# Patient Record
Sex: MALE | Race: WHITE | HISPANIC OR LATINO | Employment: FULL TIME | URBAN - METROPOLITAN AREA
[De-identification: names, ages, dates, MRNs, and addresses within clinical notes are randomized per-mention and may not be internally consistent; named-entity substitution may affect disease eponyms.]

---

## 2021-09-03 NOTE — TELEPHONE ENCOUNTER
Rx Refill Note  Requested Prescriptions     Pending Prescriptions Disp Refills   • rosuvastatin (CRESTOR) 10 MG tablet [Pharmacy Med Name: ROSUVASTATIN 10MG TABLETS] 90 tablet      Sig: TAKE 1 TABLET BY MOUTH DAILY   • levothyroxine (SYNTHROID, LEVOTHROID) 125 MCG tablet [Pharmacy Med Name: LEVOTHYROXINE 0.125MG (125MCG) TAB] 90 tablet      Sig: TAKE 1 TABLET BY MOUTH DAILY IN THE MORNING ON AN EMPTY STOMACH      Last office visit with prescribing clinician: Visit date not found      Next office visit with prescribing clinician: 9/20/2021            Renetta Dos Santos MA  09/03/21, 09:14 EDT

## 2021-09-06 RX ORDER — LEVOTHYROXINE SODIUM 0.12 MG/1
TABLET ORAL
Qty: 90 TABLET | Refills: 2 | Status: SHIPPED | OUTPATIENT
Start: 2021-09-06 | End: 2022-02-21 | Stop reason: SDUPTHER

## 2021-09-06 RX ORDER — ROSUVASTATIN CALCIUM 10 MG/1
TABLET, COATED ORAL
Qty: 90 TABLET | Refills: 2 | Status: SHIPPED | OUTPATIENT
Start: 2021-09-06 | End: 2022-02-21 | Stop reason: SDUPTHER

## 2021-09-20 ENCOUNTER — OFFICE VISIT (OUTPATIENT)
Dept: INTERNAL MEDICINE | Facility: CLINIC | Age: 61
End: 2021-09-20

## 2021-09-20 VITALS
SYSTOLIC BLOOD PRESSURE: 124 MMHG | TEMPERATURE: 97 F | BODY MASS INDEX: 28.49 KG/M2 | HEART RATE: 68 BPM | WEIGHT: 188 LBS | HEIGHT: 68 IN | OXYGEN SATURATION: 99 % | DIASTOLIC BLOOD PRESSURE: 72 MMHG | RESPIRATION RATE: 18 BRPM

## 2021-09-20 DIAGNOSIS — E03.9 HYPOTHYROIDISM, UNSPECIFIED TYPE: ICD-10-CM

## 2021-09-20 DIAGNOSIS — Z00.00 WELL ADULT EXAM: Primary | ICD-10-CM

## 2021-09-20 DIAGNOSIS — Z11.4 ENCOUNTER FOR SCREENING FOR HIV: ICD-10-CM

## 2021-09-20 DIAGNOSIS — Z11.59 ENCOUNTER FOR HCV SCREENING TEST FOR LOW RISK PATIENT: ICD-10-CM

## 2021-09-20 DIAGNOSIS — J30.2 SEASONAL ALLERGIES: ICD-10-CM

## 2021-09-20 DIAGNOSIS — Z12.5 PROSTATE CANCER SCREENING: ICD-10-CM

## 2021-09-20 PROCEDURE — 90686 IIV4 VACC NO PRSV 0.5 ML IM: CPT | Performed by: INTERNAL MEDICINE

## 2021-09-20 PROCEDURE — 99396 PREV VISIT EST AGE 40-64: CPT | Performed by: INTERNAL MEDICINE

## 2021-09-20 PROCEDURE — 90471 IMMUNIZATION ADMIN: CPT | Performed by: INTERNAL MEDICINE

## 2021-09-20 NOTE — PROGRESS NOTES
"Chief Complaint   Patient presents with   • Hypothyroidism     follow up    • Med Refill       Subjective   Crow Wolfe is a 61 y.o. male.     Presenting today for physical exam.     He is overall feeling well physically, except for seasonal allergies. Over the past two days he has had worsening congestion, sinus pressure, itchy, watery eyes, and post nasal drip. This began after a long bike ride in the [facility]. He has been using an otc antihistamine with minimal relief.     Emotionally feeling well, phq 2 negative.     Hypothyroidism  Associated symptoms include congestion. Pertinent negatives include no arthralgias, chest pain, chills, coughing, fever, myalgias, rash, sore throat or vomiting.        The following portions of the patient's history were reviewed and updated as appropriate: allergies, current medications, past family history, past medical history, past social history, past surgical history, and problem list.    Review of Systems   Constitutional: Negative for chills and fever.   HENT: Positive for congestion, postnasal drip and rhinorrhea. Negative for sore throat.    Eyes: Positive for itching. Negative for redness.   Respiratory: Negative for cough and shortness of breath.    Cardiovascular: Negative for chest pain and palpitations.   Gastrointestinal: Negative for constipation, diarrhea and vomiting.   Genitourinary: Negative for dysuria and hematuria.   Musculoskeletal: Negative for arthralgias and myalgias.   Skin: Negative for rash and bruise.   Neurological: Negative for seizures and syncope.         Objective   Body mass index is 28.59 kg/m².   Vitals:    09/20/21 1021   BP: 124/72   Pulse: 68   Resp: 18   Temp: 97 °F (36.1 °C)   SpO2: 99%   Weight: 85.3 kg (188 lb)   Height: 172.7 cm (68\")        Physical Exam  Constitutional:       Appearance: Normal appearance.   HENT:      Head: Normocephalic and atraumatic.      Right Ear: Ear canal normal. There is impacted cerumen.      Left Ear: " Tympanic membrane normal.      Nose: Congestion present.      Mouth/Throat:      Mouth: Mucous membranes are moist.      Pharynx: Oropharynx is clear. Posterior oropharyngeal erythema present.   Eyes:      Extraocular Movements: Extraocular movements intact.      Conjunctiva/sclera: Conjunctivae normal.      Pupils: Pupils are equal, round, and reactive to light.   Neck:      Vascular: No carotid bruit.   Cardiovascular:      Rate and Rhythm: Normal rate and regular rhythm.      Heart sounds: No murmur heard.     Pulmonary:      Effort: Pulmonary effort is normal.      Breath sounds: Normal breath sounds.   Abdominal:      General: Bowel sounds are normal.      Palpations: Abdomen is soft.   Musculoskeletal:      Cervical back: Neck supple.   Lymphadenopathy:      Cervical: No cervical adenopathy.   Skin:     General: Skin is warm and dry.      Capillary Refill: Capillary refill takes less than 2 seconds.   Neurological:      Mental Status: He is alert.           Current Outpatient Medications:   •  levothyroxine (SYNTHROID, LEVOTHROID) 125 MCG tablet, TAKE 1 TABLET BY MOUTH DAILY IN THE MORNING ON AN EMPTY STOMACH, Disp: 90 tablet, Rfl: 2  •  rosuvastatin (CRESTOR) 10 MG tablet, TAKE 1 TABLET BY MOUTH DAILY, Disp: 90 tablet, Rfl: 2     No results found for: CBCDIF, CMP, LIPIDINTERP, HGBA1C, TSH, [id number], PSA, TESTOSTERONE     Health Maintenance   Topic Date Due   • TDAP/TD VACCINES (1 - Tdap) Never done   • ZOSTER VACCINE (2 of 2) 04/08/2019   • LIPID PANEL  Never done   • INFLUENZA VACCINE  10/01/2021        There is no immunization history for the selected administration types on file for this patient.    Assessment/Plan   Diagnoses and all orders for this visit:    1. Well adult exam (Primary)  -     CBC & Differential  -     Comprehensive Metabolic Panel  -     Lipid Panel With / Chol / HDL Ratio  -     Hemoglobin A1c  -     Ambulatory Referral For Screening Colonoscopy  -     FluLaval >6 Months  ([id number])    2. Encounter for screening for HIV  -     HIV-1 / O / 2 Ag / Antibody 4th Generation    3. Encounter for HCV screening test for low risk patient  -     Hepatitis C Antibody    4. Hypothyroidism, unspecified type  Assessment & Plan:  Checking TSH today; will adjust dose of synthroid base on labs.     Orders:  -     TSH    5. Prostate cancer screening  -     PSA Screen    6. Seasonal allergies  Assessment & Plan:  Recommended addition of flonase nasal spray and pataday eye drops as needed for treatment of seasonal allergies, especially moving into ragweed season. RTC if no improvement in 2-4 weeks or sooner if additional symptoms develop.            Well adult exam  - labs checked and evaluated    Colonoscopy - needs this year   Prostate - checking   Glaucoma - yearly   AAA - n/a  Lung cancer - n/a  HIV - checking  HCV - checking  DM - chekcing  HLD - checking  Smoking - no  Depression - PHQ2 negative   Vaccines - influenza needed; giving today   Falls - no   Alcohol Screening - no     Discussed mental health, sexual health, substance use, abuse, anticipatory guidance given.      Return in about 6 months (around 3/20/2022).     Kady Carrillo MD  Internal Medicine/Pediatrics PGY-4

## 2021-09-20 NOTE — ASSESSMENT & PLAN NOTE
Recommended addition of flonase nasal spray and pataday eye drops as needed for treatment of seasonal allergies, especially moving into ragweed season. RTC if no improvement in 2-4 weeks or sooner if additional symptoms develop.

## 2021-09-21 LAB
ALBUMIN SERPL-MCNC: 4.8 G/DL (ref 3.5–5.2)
ALBUMIN/GLOB SERPL: 2.3 G/DL
ALP SERPL-CCNC: 65 U/L (ref 39–117)
ALT SERPL-CCNC: 34 U/L (ref 1–41)
AST SERPL-CCNC: 32 U/L (ref 1–40)
BASOPHILS # BLD AUTO: 0.03 10*3/MM3 (ref 0–0.2)
BASOPHILS NFR BLD AUTO: 0.7 % (ref 0–1.5)
BILIRUB SERPL-MCNC: 0.5 MG/DL (ref 0–1.2)
BUN SERPL-MCNC: 23 MG/DL (ref 8–23)
BUN/CREAT SERPL: 25.6 (ref 7–25)
CALCIUM SERPL-MCNC: 9.7 MG/DL (ref 8.6–10.5)
CHLORIDE SERPL-SCNC: 101 MMOL/L (ref 98–107)
CHOLEST SERPL-MCNC: 185 MG/DL (ref 0–200)
CHOLEST/HDLC SERPL: 2.47 {RATIO}
CO2 SERPL-SCNC: 27.4 MMOL/L (ref 22–29)
CREAT SERPL-MCNC: 0.9 MG/DL (ref 0.76–1.27)
EOSINOPHIL # BLD AUTO: 0.04 10*3/MM3 (ref 0–0.4)
EOSINOPHIL NFR BLD AUTO: 1 % (ref 0.3–6.2)
ERYTHROCYTE [DISTWIDTH] IN BLOOD BY AUTOMATED COUNT: 14.4 % (ref 12.3–15.4)
GLOBULIN SER CALC-MCNC: 2.1 GM/DL
GLUCOSE SERPL-MCNC: 83 MG/DL (ref 65–99)
HBA1C MFR BLD: 5.9 % (ref 4.8–5.6)
HCT VFR BLD AUTO: 42.6 % (ref 37.5–51)
HCV AB S/CO SERPL IA: <0.1 S/CO RATIO (ref 0–0.9)
HDLC SERPL-MCNC: 75 MG/DL (ref 40–60)
HGB BLD-MCNC: 14.2 G/DL (ref 13–17.7)
HIV 1+2 AB+HIV1 P24 AG SERPL QL IA: NON REACTIVE
IMM GRANULOCYTES # BLD AUTO: 0.01 10*3/MM3 (ref 0–0.05)
IMM GRANULOCYTES NFR BLD AUTO: 0.2 % (ref 0–0.5)
LDLC SERPL CALC-MCNC: 96 MG/DL (ref 0–100)
LYMPHOCYTES # BLD AUTO: 1.04 10*3/MM3 (ref 0.7–3.1)
LYMPHOCYTES NFR BLD AUTO: 25.7 % (ref 19.6–45.3)
MCH RBC QN AUTO: 29 PG (ref 26.6–33)
MCHC RBC AUTO-ENTMCNC: 33.3 G/DL (ref 31.5–35.7)
MCV RBC AUTO: 87.1 FL (ref 79–97)
MONOCYTES # BLD AUTO: 0.43 10*3/MM3 (ref 0.1–0.9)
MONOCYTES NFR BLD AUTO: 10.6 % (ref 5–12)
NEUTROPHILS # BLD AUTO: 2.49 10*3/MM3 (ref 1.7–7)
NEUTROPHILS NFR BLD AUTO: 61.8 % (ref 42.7–76)
NRBC BLD AUTO-RTO: 0 /100 WBC (ref 0–0.2)
PLATELET # BLD AUTO: 220 10*3/MM3 (ref 140–450)
POTASSIUM SERPL-SCNC: 4.7 MMOL/L (ref 3.5–5.2)
PROT SERPL-MCNC: 6.9 G/DL (ref 6–8.5)
PSA SERPL-MCNC: 2.39 NG/ML (ref 0–4)
RBC # BLD AUTO: 4.89 10*6/MM3 (ref 4.14–5.8)
SODIUM SERPL-SCNC: 138 MMOL/L (ref 136–145)
TRIGL SERPL-MCNC: 77 MG/DL (ref 0–150)
TSH SERPL DL<=0.005 MIU/L-ACNC: 0.33 UIU/ML (ref 0.27–4.2)
VLDLC SERPL CALC-MCNC: 14 MG/DL (ref 5–40)
WBC # BLD AUTO: 4.04 10*3/MM3 (ref 3.4–10.8)

## 2021-09-22 ENCOUNTER — PREP FOR SURGERY (OUTPATIENT)
Dept: SURGERY | Facility: SURGERY CENTER | Age: 61
End: 2021-09-22

## 2021-09-22 DIAGNOSIS — Z12.11 ENCOUNTER FOR SCREENING FOR MALIGNANT NEOPLASM OF COLON: Primary | ICD-10-CM

## 2021-09-23 RX ORDER — SODIUM CHLORIDE, SODIUM LACTATE, POTASSIUM CHLORIDE, CALCIUM CHLORIDE 600; 310; 30; 20 MG/100ML; MG/100ML; MG/100ML; MG/100ML
30 INJECTION, SOLUTION INTRAVENOUS CONTINUOUS PRN
Status: CANCELLED | OUTPATIENT
Start: 2021-09-23

## 2021-09-23 RX ORDER — SODIUM CHLORIDE 0.9 % (FLUSH) 0.9 %
3 SYRINGE (ML) INJECTION EVERY 12 HOURS SCHEDULED
Status: CANCELLED | OUTPATIENT
Start: 2021-09-23

## 2021-09-23 RX ORDER — SODIUM CHLORIDE 0.9 % (FLUSH) 0.9 %
10 SYRINGE (ML) INJECTION AS NEEDED
Status: CANCELLED | OUTPATIENT
Start: 2021-09-23

## 2022-01-05 NOTE — SIGNIFICANT NOTE
Education provided the Patient on the following:    - Nothing to Eat or Drink after MN the night before the procedure    - Avoid red/purple fluids while completing their bowel prep as ordered by physician  -Contact Gastrointerologist office for any questions about specific details regarding colon prep    -You will need to have someone drive you home after your colonoscopy and remain with you for 24 hours after the procedure  - The date of your Surgery, you may have one visitor at bedside or within 10-15 minutes of [facility]  -Please wear warm socks when you arrive for your colonoscopy  -Remove all jewelry and leave any valuables before arriving the day of your procedure (all will have to be removed before leaving preop)  -You will need to arrive at 0815 on 1/10/22 for your colonoscopy    -Feel free to contact us at: [phone] with any additional questions/concerns

## 2022-01-10 ENCOUNTER — HOSPITAL ENCOUNTER (OUTPATIENT)
Facility: SURGERY CENTER | Age: 62
Setting detail: HOSPITAL OUTPATIENT SURGERY
Discharge: HOME | End: 2022-01-10
Attending: INTERNAL MEDICINE | Admitting: INTERNAL MEDICINE

## 2022-01-10 ENCOUNTER — ANESTHESIA (OUTPATIENT)
Dept: SURGERY | Facility: SURGERY CENTER | Age: 62
End: 2022-01-10

## 2022-01-10 ENCOUNTER — ANESTHESIA EVENT (OUTPATIENT)
Dept: SURGERY | Facility: SURGERY CENTER | Age: 62
End: 2022-01-10

## 2022-01-10 VITALS
TEMPERATURE: 98 F | WEIGHT: 181 LBS | BODY MASS INDEX: 27.43 KG/M2 | HEIGHT: 68 IN | HEART RATE: 75 BPM | RESPIRATION RATE: 16 BRPM | SYSTOLIC BLOOD PRESSURE: 116 MMHG | OXYGEN SATURATION: 96 % | DIASTOLIC BLOOD PRESSURE: 78 MMHG

## 2022-01-10 DIAGNOSIS — Z12.11 ENCOUNTER FOR SCREENING FOR MALIGNANT NEOPLASM OF COLON: ICD-10-CM

## 2022-01-10 PROCEDURE — 88305 TISSUE EXAM BY PATHOLOGIST: CPT | Performed by: INTERNAL MEDICINE

## 2022-01-10 PROCEDURE — 45380 COLONOSCOPY AND BIOPSY: CPT | Performed by: INTERNAL MEDICINE

## 2022-01-10 PROCEDURE — 0DBK8ZZ EXCISION OF ASCENDING COLON, VIA NATURAL OR ARTIFICIAL OPENING ENDOSCOPIC: ICD-10-PCS | Performed by: INTERNAL MEDICINE

## 2022-01-10 PROCEDURE — 25010000002 PROPOFOL 10 MG/ML EMULSION: Performed by: NURSE ANESTHETIST, CERTIFIED REGISTERED

## 2022-01-10 RX ORDER — SODIUM CHLORIDE, SODIUM LACTATE, POTASSIUM CHLORIDE, CALCIUM CHLORIDE 600; 310; 30; 20 MG/100ML; MG/100ML; MG/100ML; MG/100ML
30 INJECTION, SOLUTION INTRAVENOUS CONTINUOUS PRN
Status: DISCONTINUED | OUTPATIENT
Start: 2022-01-10 | End: 2022-01-10 | Stop reason: HOSPADM

## 2022-01-10 RX ORDER — SODIUM CHLORIDE 0.9 % (FLUSH) 0.9 %
10 SYRINGE (ML) INJECTION AS NEEDED
Status: DISCONTINUED | OUTPATIENT
Start: 2022-01-10 | End: 2022-01-10 | Stop reason: HOSPADM

## 2022-01-10 RX ORDER — LIDOCAINE HYDROCHLORIDE 20 MG/ML
INJECTION, SOLUTION INFILTRATION; PERINEURAL AS NEEDED
Status: DISCONTINUED | OUTPATIENT
Start: 2022-01-10 | End: 2022-01-10 | Stop reason: SURG

## 2022-01-10 RX ORDER — PROPOFOL 10 MG/ML
VIAL (ML) INTRAVENOUS AS NEEDED
Status: DISCONTINUED | OUTPATIENT
Start: 2022-01-10 | End: 2022-01-10 | Stop reason: SURG

## 2022-01-10 RX ORDER — SODIUM CHLORIDE 0.9 % (FLUSH) 0.9 %
3 SYRINGE (ML) INJECTION EVERY 12 HOURS SCHEDULED
Status: DISCONTINUED | OUTPATIENT
Start: 2022-01-10 | End: 2022-01-10 | Stop reason: HOSPADM

## 2022-01-10 RX ADMIN — PROPOFOL 200 MCG/KG/MIN: 10 INJECTION, EMULSION INTRAVENOUS at 09:10

## 2022-01-10 RX ADMIN — SODIUM CHLORIDE, POTASSIUM CHLORIDE, SODIUM LACTATE AND CALCIUM CHLORIDE 30 ML/HR: 600; 310; 30; 20 INJECTION, SOLUTION INTRAVENOUS at 08:44

## 2022-01-10 RX ADMIN — PROPOFOL 100 MG: 10 INJECTION, EMULSION INTRAVENOUS at 09:10

## 2022-01-10 RX ADMIN — LIDOCAINE HYDROCHLORIDE 60 MG: 20 INJECTION, SOLUTION INFILTRATION; PERINEURAL at 09:10

## 2022-01-10 NOTE — H&P
No chief complaint on file.      HPI  screening         Problem List:    Patient Active Problem List   Diagnosis   • Well adult exam   • Encounter for screening for HIV   • Encounter for HCV screening test for low risk patient   • Hypothyroidism   • Prostate cancer screening   • Seasonal allergies       Medical History:    Past Medical History:   Diagnosis Date   • Cancer (HCC)     thyroid   • Hyperlipidemia    • Hypothyroidism         Social History:    Social History     Socioeconomic History   • Marital status:    Tobacco Use   • Smoking status: Never Smoker   • Smokeless tobacco: Never Used   Vaping Use   • Vaping Use: Never used   Substance and Sexual Activity   • Alcohol use: Yes     Comment: occasionally   • Sexual activity: Defer       Family History:   Family History   Problem Relation Age of Onset   • Cancer Sister        Surgical History:   Past Surgical History:   Procedure Laterality Date   • COLONOSCOPY     • TOTAL THYROIDECTOMY         No current facility-administered medications for this encounter.    Current Outpatient Medications:   •  levothyroxine (SYNTHROID, LEVOTHROID) 125 MCG tablet, TAKE 1 TABLET BY MOUTH DAILY IN THE MORNING ON AN EMPTY STOMACH, Disp: 90 tablet, Rfl: 2  •  rosuvastatin (CRESTOR) 10 MG tablet, TAKE 1 TABLET BY MOUTH DAILY, Disp: 90 tablet, Rfl: 2    Allergies: No Known Allergies     The following portions of the patient's history were reviewed by me and updated as appropriate: review of systems, allergies, current medications, past family history, past medical history, past social history, past surgical history and problem list.    There were no vitals filed for this visit.    PHYSICAL EXAM:    CONSTITUTIONAL:  today's vital signs reviewed by me  GASTROINTESTINAL: abdomen is soft nontender nondistended with normal active bowel sounds, no masses are appreciated    Assessment/ Plan  Encounter for screening colonoscopy    Risks and benefits as well as alternatives to  endoscopic evaluation were explained to the patient and they voiced understanding and wish to proceed.  These risks include but are not limited to the risk of bleeding, perforation, adverse reaction to sedation, and missed lesions.  The patient was given the opportunity to ask questions prior to the endoscopic procedure.

## 2022-01-10 NOTE — ANESTHESIA POSTPROCEDURE EVALUATION
"Patient: Crow Wolfe    Procedure Summary     Date: 01/10/22 Room / Location: [facility]  / [facility]    Anesthesia Start: 0907 Anesthesia Stop: 0926    Procedure: COLONOSCOPY WITH POLYPECTOMY (N/A ) Diagnosis:       Encounter for screening for malignant neoplasm of colon      (Encounter for screening for malignant neoplasm of colon [Z12.11])    Surgeons: Bruno Hernandez MD Provider: Kameron Gillespie MD    Anesthesia Type: MAC ASA Status: 2          Anesthesia Type: MAC    Vitals  Vitals Value Taken Time   /78 01/10/22 0945   Temp 36.7 °C (98 °F) 01/10/22 0925   Pulse 75 01/10/22 0945   Resp 16 01/10/22 0945   SpO2 96 % 01/10/22 0945           Post Anesthesia Care and Evaluation    Patient location during evaluation: bedside  Patient participation: complete - patient participated  Level of consciousness: awake and alert  Pain score: 0  Pain management: adequate  Airway patency: patent  Anesthetic complications: No anesthetic complications  PONV Status: none  Cardiovascular status: acceptable and hemodynamically stable  Respiratory status: acceptable and spontaneous ventilation  Hydration status: acceptable    Comments: /78   Pulse 75   Temp 36.7 °C (98 °F) (Temporal)   Resp 16   Ht 172.7 cm (68\")   Wt 82.1 kg (181 lb)   SpO2 96%   BMI 27.52 kg/m²         "

## 2022-01-10 NOTE — ANESTHESIA PREPROCEDURE EVALUATION
Anesthesia Evaluation     Patient summary reviewed and Nursing notes reviewed   NPO Solid Status: > 8 hours  NPO Liquid Status: > 2 hours           Airway   Mallampati: II  TM distance: >3 FB  Neck ROM: full  No difficulty expected  Dental - normal exam     Pulmonary - negative pulmonary ROS and normal exam   Cardiovascular - normal exam  Exercise tolerance: good (4-7 METS)    (+) hyperlipidemia,       Neuro/Psych  GI/Hepatic/Renal/Endo    (+)   thyroid problem hypothyroidism    Musculoskeletal (-) negative ROS    Abdominal    Substance History - negative use     OB/GYN          Other      history of cancer remission                    Anesthesia Plan    ASA 2     MAC     intravenous induction     Anesthetic plan, all risks, benefits, and alternatives have been provided, discussed and informed consent has been obtained with: patient and spouse/significant other.    Plan discussed with CRNA.

## 2022-01-11 LAB
LAB AP CASE REPORT: NORMAL
LAB AP CLINICAL INFORMATION: NORMAL
PATH REPORT.FINAL DX SPEC: NORMAL
PATH REPORT.GROSS SPEC: NORMAL

## 2022-02-21 NOTE — TELEPHONE ENCOUNTER
Caller: Crow Wolfe    Relationship: Self    Best call back number: [phone]    Requested Prescriptions:   Requested Prescriptions     Pending Prescriptions Disp Refills   • levothyroxine (SYNTHROID, LEVOTHROID) 125 MCG tablet 90 tablet 2     Sig: Take 1 tablet by mouth Every Morning.   • rosuvastatin (CRESTOR) 10 MG tablet 90 tablet 2     Sig: Take 1 tablet by mouth Daily.        Pharmacy where request should be sent: [facility] #[id number] - [facility] [place] - [address] [address] AT [facility] [address] [address] - [phone]  - [phone]      Additional details provided by patient: PATIENT STATES HE WILL BE LEAVING THE COUNTRY FOR 3 MONTHS STARTING ON 2/24/2022, WOULD LIKE TO REQUEST A 90 DAY SUPPLY BE CALLED IN.    Does the patient have less than a 3 day supply:  [] Yes  [x] No    Mayra Moore Rep   02/21/22 15:01 EST

## 2022-02-21 NOTE — TELEPHONE ENCOUNTER
Rx Refill Note  Requested Prescriptions     Pending Prescriptions Disp Refills   • levothyroxine (SYNTHROID, LEVOTHROID) 125 MCG tablet 90 tablet 2     Sig: Take 1 tablet by mouth Every Morning.   • rosuvastatin (CRESTOR) 10 MG tablet 90 tablet 2     Sig: Take 1 tablet by mouth Daily.      Last office visit with prescribing clinician: 9/20/2021      Next office visit with prescribing clinician: 3/21/2022            Renetta Dos Santos MA  02/21/22, 15:17 EST

## 2022-02-22 RX ORDER — LEVOTHYROXINE SODIUM 0.12 MG/1
125 TABLET ORAL EVERY MORNING
Qty: 90 TABLET | Refills: 2 | Status: SHIPPED | OUTPATIENT
Start: 2022-02-22 | End: 2022-10-21 | Stop reason: SDUPTHER

## 2022-02-22 RX ORDER — ROSUVASTATIN CALCIUM 10 MG/1
10 TABLET, COATED ORAL DAILY
Qty: 90 TABLET | Refills: 2 | Status: SHIPPED | OUTPATIENT
Start: 2022-02-22 | End: 2022-10-21 | Stop reason: SDUPTHER

## 2022-10-04 ENCOUNTER — OFFICE VISIT (OUTPATIENT)
Dept: INTERNAL MEDICINE | Facility: CLINIC | Age: 62
End: 2022-10-04

## 2022-10-04 VITALS
HEART RATE: 87 BPM | HEIGHT: 68 IN | RESPIRATION RATE: 18 BRPM | WEIGHT: 184 LBS | SYSTOLIC BLOOD PRESSURE: 120 MMHG | DIASTOLIC BLOOD PRESSURE: 76 MMHG | TEMPERATURE: 98.4 F | BODY MASS INDEX: 27.89 KG/M2 | OXYGEN SATURATION: 97 %

## 2022-10-04 DIAGNOSIS — Z08 ENCOUNTER FOR FOLLOW-UP SURVEILLANCE OF THYROID CANCER: ICD-10-CM

## 2022-10-04 DIAGNOSIS — Z00.00 WELL ADULT EXAM: ICD-10-CM

## 2022-10-04 DIAGNOSIS — C73 THYROID CANCER: ICD-10-CM

## 2022-10-04 DIAGNOSIS — Z12.5 PROSTATE CANCER SCREENING: ICD-10-CM

## 2022-10-04 DIAGNOSIS — E03.9 HYPOTHYROIDISM, UNSPECIFIED TYPE: Primary | ICD-10-CM

## 2022-10-04 DIAGNOSIS — Z85.850 ENCOUNTER FOR FOLLOW-UP SURVEILLANCE OF THYROID CANCER: ICD-10-CM

## 2022-10-04 PROCEDURE — 99396 PREV VISIT EST AGE 40-64: CPT | Performed by: INTERNAL MEDICINE

## 2022-10-04 PROCEDURE — 96127 BRIEF EMOTIONAL/BEHAV ASSMT: CPT | Performed by: INTERNAL MEDICINE

## 2022-10-04 NOTE — PROGRESS NOTES
"Chief Complaint   Patient presents with   • Annual Exam       Subjective   Crow Wolfe is a 62 y.o. male.     History of Present Illness  HLD, doing well without issues       The following portions of the patient's history were reviewed and updated as appropriate: allergies, current medications, past family history, past medical history, past social history, past surgical history, and problem list.    Review of Systems      Objective   Body mass index is 27.98 kg/m².   Vitals:    10/04/22 0819   BP: 120/76   Pulse: 87   Resp: 18   Temp: 98.4 °F (36.9 °C)   SpO2: 97%   Weight: 83.5 kg (184 lb)   Height: 172.7 cm (68\")        Physical Exam  Vitals and nursing note reviewed.   Constitutional:       General: He is not in acute distress.     Appearance: Normal appearance.   HENT:      Head: Normocephalic and atraumatic.      Right Ear: External ear normal.      Left Ear: External ear normal.      Nose: Nose normal.      Mouth/Throat:      Mouth: Mucous membranes are moist.      Pharynx: Oropharynx is clear.   Eyes:      Extraocular Movements: Extraocular movements intact.      Conjunctiva/sclera: Conjunctivae normal.      Pupils: Pupils are equal, round, and reactive to light.   Cardiovascular:      Rate and Rhythm: Normal rate and regular rhythm.      Pulses: Normal pulses.      Heart sounds: Normal heart sounds. No murmur heard.    No gallop.   Pulmonary:      Effort: Pulmonary effort is normal.      Breath sounds: Normal breath sounds.   Abdominal:      General: Abdomen is flat. Bowel sounds are normal. There is no distension.      Palpations: Abdomen is soft. There is no mass.      Tenderness: There is no abdominal tenderness.   Musculoskeletal:         General: No swelling. Normal range of motion.      Cervical back: Normal range of motion and neck supple.   Skin:     General: Skin is warm and dry.      Findings: No rash.   Neurological:      General: No focal deficit present.      Mental Status: He is alert and " oriented to person, place, and time. Mental status is at baseline.   Psychiatric:         Mood and Affect: Mood normal.         Behavior: Behavior normal.           Current Outpatient Medications:   •  levothyroxine (SYNTHROID, LEVOTHROID) 125 MCG tablet, Take 1 tablet by mouth Every Morning., Disp: 90 tablet, Rfl: 2  •  rosuvastatin (CRESTOR) 10 MG tablet, Take 1 tablet by mouth Daily., Disp: 90 tablet, Rfl: 2     Lab Results   Component Value Date    HGBA1C 5.90 (H) 09/20/2021    TSH 0.330 09/20/2021    PSA 2.390 09/20/2021        Health Maintenance   Topic Date Due   • INFLUENZA VACCINE  08/01/2022   • LIPID PANEL  09/20/2022   • TDAP/TD VACCINES (2 - Td or Tdap) 01/01/2032   • ZOSTER VACCINE  Completed        Immunization History   Administered Date(s) Administered   • COVID-19 (PFIZER) PURPLE CAP 03/17/2021, 04/07/2021   • FluLaval/Fluzone >6mos 09/20/2021   • Flublok 18+yrs 10/14/2020   • Hepatitis A 12/01/2021   • Hepatitis B 12/01/2021   • IPV 12/07/2021   • Influenza, Unspecified 03/11/2020   • Japanese Encephalitis 12/01/2021   • MMR 12/01/2021   • Rabies 12/01/2021, 12/07/2021   • Shingrix 11/09/2018, 02/11/2019   • Tdap 01/01/2022   • Typhoid, Unspecified 12/01/2021       Assessment & Plan   Diagnoses and all orders for this visit:    1. Well adult exam (Primary)  -     Comprehensive Metabolic Panel  -     Lipid Panel  -     Hemoglobin A1c  -     CBC & Differential    2. Hypothyroidism, unspecified type  -     TSH  -     T4, Free    3. Prostate cancer screening  -     PSA Screen    4. Thyroid cancer (HCC)    - check thyroid labs today, has not seen endo lately, will get TSH to goal; getting records for risk stratification       Well adult exam  - labs checked and evaluated    Colonoscopy - 1/10/22, next in 2029 2/2 hyperplastic polyp  Prostate - screening, counseled  Glaucoma - yearly  AAA - na  Lung cancer - na  HIV - neg  HCV - neg  DM - checking  HLD - checking  Smoking - no  Depression - no,  [id number]  Vaccines - counseled  Falls - no  Alcohol Screening - no    Discussed mental health, sexual health, substance use, abuse, anticipatory guidance given.      No follow-ups on file.     Kal Rosales MD  [facility] [place]  Internal Medicine and Pediatrics  Phone: [phone]  Fax: [phone]

## 2022-10-10 LAB
ALBUMIN SERPL-MCNC: 4.7 G/DL (ref 3.8–4.8)
ALBUMIN/GLOB SERPL: 1.9 {RATIO} (ref 1.2–2.2)
ALP SERPL-CCNC: 52 IU/L (ref 44–121)
ALT SERPL-CCNC: 23 IU/L (ref 0–44)
AST SERPL-CCNC: 29 IU/L (ref 0–40)
BASOPHILS # BLD AUTO: 0 X10E3/UL (ref 0–0.2)
BASOPHILS NFR BLD AUTO: 1 %
BILIRUB SERPL-MCNC: 0.5 MG/DL (ref 0–1.2)
BUN SERPL-MCNC: 17 MG/DL (ref 8–27)
BUN/CREAT SERPL: 18 (ref 10–24)
CALCIUM SERPL-MCNC: 9.7 MG/DL (ref 8.6–10.2)
CHLORIDE SERPL-SCNC: 100 MMOL/L (ref 96–106)
CHOLEST SERPL-MCNC: 313 MG/DL (ref 100–199)
CO2 SERPL-SCNC: 24 MMOL/L (ref 20–29)
CREAT SERPL-MCNC: 0.96 MG/DL (ref 0.76–1.27)
EGFRCR SERPLBLD CKD-EPI 2021: 90 ML/MIN/1.73
EOSINOPHIL # BLD AUTO: 0.1 X10E3/UL (ref 0–0.4)
EOSINOPHIL NFR BLD AUTO: 2 %
ERYTHROCYTE [DISTWIDTH] IN BLOOD BY AUTOMATED COUNT: 14.1 % (ref 11.6–15.4)
GLOBULIN SER CALC-MCNC: 2.5 G/DL (ref 1.5–4.5)
GLUCOSE SERPL-MCNC: 88 MG/DL (ref 70–99)
HBA1C MFR BLD: 6 % (ref 4.8–5.6)
HCT VFR BLD AUTO: 45.9 % (ref 37.5–51)
HDLC SERPL-MCNC: 74 MG/DL
HGB BLD-MCNC: 15.6 G/DL (ref 13–17.7)
IMM GRANULOCYTES # BLD AUTO: 0 X10E3/UL (ref 0–0.1)
IMM GRANULOCYTES NFR BLD AUTO: 0 %
LDLC SERPL CALC-MCNC: 210 MG/DL (ref 0–99)
LYMPHOCYTES # BLD AUTO: 1 X10E3/UL (ref 0.7–3.1)
LYMPHOCYTES NFR BLD AUTO: 33 %
MCH RBC QN AUTO: 29.3 PG (ref 26.6–33)
MCHC RBC AUTO-ENTMCNC: 34 G/DL (ref 31.5–35.7)
MCV RBC AUTO: 86 FL (ref 79–97)
MONOCYTES # BLD AUTO: 0.3 X10E3/UL (ref 0.1–0.9)
MONOCYTES NFR BLD AUTO: 10 %
NEUTROPHILS # BLD AUTO: 1.7 X10E3/UL (ref 1.4–7)
NEUTROPHILS NFR BLD AUTO: 54 %
PLATELET # BLD AUTO: 199 X10E3/UL (ref 150–450)
POTASSIUM SERPL-SCNC: 5 MMOL/L (ref 3.5–5.2)
PROT SERPL-MCNC: 7.2 G/DL (ref 6–8.5)
PSA SERPL-MCNC: 2 NG/ML (ref 0–4)
RBC # BLD AUTO: 5.32 X10E6/UL (ref 4.14–5.8)
SODIUM SERPL-SCNC: 139 MMOL/L (ref 134–144)
T4 FREE SERPL-MCNC: 1.56 NG/DL (ref 0.82–1.77)
THYROGLOB SERPL-MCNC: 19 NG/ML
TRIGL SERPL-MCNC: 159 MG/DL (ref 0–149)
TSH SERPL DL<=0.005 MIU/L-ACNC: 2.12 UIU/ML (ref 0.45–4.5)
VLDLC SERPL CALC-MCNC: 29 MG/DL (ref 5–40)
WBC # BLD AUTO: 3.1 X10E3/UL (ref 3.4–10.8)

## 2022-10-21 ENCOUNTER — TELEPHONE (OUTPATIENT)
Dept: INTERNAL MEDICINE | Facility: CLINIC | Age: 62
End: 2022-10-21

## 2022-10-21 RX ORDER — LEVOTHYROXINE SODIUM 0.12 MG/1
125 TABLET ORAL EVERY MORNING
Qty: 90 TABLET | Refills: 1 | Status: SHIPPED | OUTPATIENT
Start: 2022-10-21

## 2022-10-21 RX ORDER — ROSUVASTATIN CALCIUM 10 MG/1
10 TABLET, COATED ORAL DAILY
Qty: 90 TABLET | Refills: 1 | Status: SHIPPED | OUTPATIENT
Start: 2022-10-21

## 2022-10-21 NOTE — TELEPHONE ENCOUNTER
Patient returned call. I didn't see anything to voice to him as far as labs. Patient would like a call back on behalf of his labs to go over them.     Patient was also requesting medications to be sent to the mail service. He stated that he never got his refills when he was here in the office for his appointment on 10/04/22. Refills sent to the mail service.

## 2022-10-21 NOTE — TELEPHONE ENCOUNTER
Caller: Crow Wolfe    Relationship: Self    Best call back number: [phone] (Home)    What is the best time to reach you: ANYTIME    Who are you requesting to speak with (clinical staff, provider,  specific staff member): DR. JENNINGS    Do you know the name of the person who called:      What was the call regarding: PATIENT IS RETURNING MISSED CALL FROM DR. JENNINGS.     Do you require a callback: YES        THANKS

## 2022-11-07 ENCOUNTER — TELEPHONE (OUTPATIENT)
Dept: INTERNAL MEDICINE | Facility: CLINIC | Age: 62
End: 2022-11-07

## 2022-11-07 NOTE — TELEPHONE ENCOUNTER
PATIENT WOULD LIKE TO KNOW IF DR. JENNINGS HAS ANY RECOMMENDATIONS FOR SOMEONE WHO DOES LIPOSUCTION?    PATIENT> [phone]

## 2023-01-27 ENCOUNTER — PATIENT ROUNDING (BHMG ONLY) (OUTPATIENT)
Dept: ENDOCRINOLOGY | Age: 63
End: 2023-01-27
Payer: COMMERCIAL

## 2023-01-27 ENCOUNTER — OFFICE VISIT (OUTPATIENT)
Dept: ENDOCRINOLOGY | Age: 63
End: 2023-01-27
Payer: COMMERCIAL

## 2023-01-27 VITALS
SYSTOLIC BLOOD PRESSURE: 114 MMHG | OXYGEN SATURATION: 94 % | DIASTOLIC BLOOD PRESSURE: 80 MMHG | TEMPERATURE: 97.3 F | BODY MASS INDEX: 29.34 KG/M2 | HEART RATE: 70 BPM | HEIGHT: 68 IN | WEIGHT: 193.6 LBS

## 2023-01-27 DIAGNOSIS — E89.0 POST-SURGICAL HYPOTHYROIDISM: ICD-10-CM

## 2023-01-27 DIAGNOSIS — D72.819 LEUKOPENIA, UNSPECIFIED TYPE: ICD-10-CM

## 2023-01-27 DIAGNOSIS — E78.5 DYSLIPIDEMIA: ICD-10-CM

## 2023-01-27 DIAGNOSIS — C73 THYROID CANCER: Primary | ICD-10-CM

## 2023-01-27 PROCEDURE — 99204 OFFICE O/P NEW MOD 45 MIN: CPT | Performed by: INTERNAL MEDICINE

## 2023-01-27 NOTE — PROGRESS NOTES
Chief Complaint  surveillance of thyroid cancer (Pt states that energy levels have been low, weight is stable, does have family hx of thyroid disease. )    Kinjal Wolfe presents to [facility] ENDOCRINOLOGY  History of Present Illness    Mr. Crow Wolfe is a 62-year-old  male who presents for evaluation and treatment recommendations for history of thyroid cancer and postsurgical hypothyroidism.    He has been referred to us by his primary care physician Dr. Kal Rosales.    The patient tells me about 10 years ago i.e. in/around 2011 when he was about 51 years old he was diagnosed with a thyroid nodule.  He recalls getting an FNA thyroid nodule biopsy.  The cytology was consistent with malignancy.  He underwent a total thyroidectomy in 2011.  He lived in [place], [place] at the time.  He does not recall the name of his surgeon or the name of the hospital.  He received I-131 HALEY treatment.  About 2 years after surgery he states there was evidence of disease recurrence.  He underwent a neck dissection.  For the next 5 years he states he has had thyroid function studies, tumor markers and neck ultrasounds done and all of these came back stable without any evidence of disease recurrence. He subsequently moved from [place], [place] to [place] and now has relocated to [place].      He has not seen an endocrinologist for some time and his primary care physician has been assisting him with managing his hypothyroidism    Patient takes levothyroxine 125 mcg on an empty stomach and waits 30 minutes before eating or drinking anything.      Patient does/does not have any symptoms.     Fatigue: present. Felt better on 150 mcg daily levothyroxine.  Weight gain: denies  Skin, hair, nail changes: denies   Low moods:  denies  Difficulty concentrating:  denies  Sleep disturbance: denies  Cold intolerance:  denies  Constipation:  denies    Patient denies anxiety, tremor, heat  "intolerance, polyphagia, weight loss, muscle weakness.     There is a family history of thyroid dysfunction, ie mother, three sisters and patient. One sister had Graves' disease.      Patient denies dysphagia, odynophagia, dry cough, hoarseness of voice or stridor.      There is no history of ionizing radiation to the head or neck.  There is possibly a family history of thyroid cancer, ie the patient recalls his mother and two sisters had their thyroid removed.  He is unsure about the details of the surgical pathology.  He is assuming they may have had cancer of the thyroid.     Patient is not on biotin or over the counter thyroid medications.   The patient has not been on Li or Amiodarone. There is no history of recent IV contrast dye.    Most recent thyroid function studies:    Component  Ref Range & Units 3 mo ago 1 yr ago   TSH  0.450 - 4.500 uIU/mL 2.120  0.330 R    [facility]ing [facility]         Thyroid imaging studies: None available.    Reviewed past medical history, allergies, medication list, family history and social history.  PMH: Thyroid cancer, s/p total thyroidectomy, HLD  Allergies: NKDA  Meds list: Reviewed and updated with the patient.  FH: Thyroid d/f, ? Two family memebers had a total thyroidectomy, unsure of final pathology  SH: Denies T/E/D, lives with wife,  for [employer]    ROS: As per HPI, otherwise negative.      Objective   Vital Signs:   /80   Pulse 70   Temp 97.3 °F (36.3 °C) (Temporal)   Ht 172.7 cm (67.99\")   Wt 87.8 kg (193 lb 9.6 oz)   SpO2 94%   BMI 29.44 kg/m²     Physical Exam  Constitutional:       General: He is not in acute distress.     Appearance: He is not ill-appearing, toxic-appearing or diaphoretic.   HENT:      Head: Normocephalic and atraumatic.      Nose: Nose normal.      Mouth/Throat:      Mouth: Mucous membranes are moist.      Pharynx: No oropharyngeal exudate or posterior oropharyngeal erythema.   Eyes:      Extraocular Movements: " Extraocular movements intact.      Conjunctiva/sclera: Conjunctivae normal.      Pupils: Pupils are equal, round, and reactive to light.   Neck:      Vascular: No carotid bruit.      Comments: Thyroidectomy scar appreciated. No palpable thyroid tissue. No palpable cervical lymph nodes.   Cardiovascular:      Rate and Rhythm: Normal rate and regular rhythm.      Pulses: Normal pulses.      Heart sounds: Normal heart sounds. No murmur heard.    No friction rub. No gallop.   Pulmonary:      Effort: Pulmonary effort is normal. No respiratory distress.      Breath sounds: Normal breath sounds. No stridor. No wheezing, rhonchi or rales.   Abdominal:      General: Bowel sounds are normal. There is no distension.      Palpations: Abdomen is soft. There is no mass.      Tenderness: There is no abdominal tenderness. There is no rebound.   Musculoskeletal:         General: No swelling, tenderness, deformity or signs of injury. Normal range of motion.      Cervical back: Full passive range of motion without pain, normal range of motion and neck supple. No rigidity or tenderness.      Right lower leg: No edema.      Left lower leg: No edema.   Lymphadenopathy:      Cervical: No cervical adenopathy.   Skin:     General: Skin is warm and dry.      Coloration: Skin is not jaundiced or pale.      Findings: No bruising, erythema, lesion or rash.   Neurological:      General: No focal deficit present.      Mental Status: He is alert and oriented to person, place, and time.      Cranial Nerves: No cranial nerve deficit.      Sensory: No sensory deficit.      Motor: No weakness.      Coordination: Coordination normal.      Gait: Gait normal.      Deep Tendon Reflexes: Reflexes normal.   Psychiatric:         Mood and Affect: Mood normal.         Behavior: Behavior normal.         Thought Content: Thought content normal.        Result Review :     TSH    TSH 10/4/22   TSH 2.120                    Assessment and Plan    Diagnoses and all  orders for this visit:    1. Thyroid cancer (HCC) (Primary)  -     TSH  -     T4, Free  -     Thyroglobulin  -     Cancel: Thyroglobulin Antibody  -     US Thyroid; Future  -     Thyroglobulin Antibody  -     Thyroglobulin Antibody  -     Anti-Thyroglobulin Antibody    2. Post-surgical hypothyroidism  -     TSH  -     T4, Free    3. Dyslipidemia  -     Lipid Panel    4. Leukopenia, unspecified type  -     CBC Auto Differential      We will obtain thyroid function studies, tumor markers and neck ultrasound.  We will review the results of these tests at the follow-up visit.  For now, we will have the patient continue his levothyroxine at 125 mcg daily.  Patient has been requested to try to find the name of his ENT surgeon/name of the hospital where he had his thyroid surgery in [place] so we may obtain the records and details of his thyroid cancer status and treatments received. We will have him sign a records release.    Note was made of an abnormal lipid panel.  The patient states that he did not take statin therapy 2 months prior to the most recent lab draw for his lipid panel. We will repeat his lipid panel. He is on a statin for two or more months now.    Note made of a low WBC count.  We will repeat a CBC.    Rest of the issues as above.      Follow Up   Return in about 6 weeks (around 3/10/2023) for f-up in 4-6 weeks with md. pls have patient sign records release form; thyroid cancer records in [place].  Patient was given instructions and counseling regarding his condition or for health maintenance advice. Please see specific information pulled into the AVS if appropriate.

## 2023-01-30 LAB — THYROGLOB AB SERPL-ACNC: 55.1 IU/ML (ref 0–0.9)

## 2023-02-06 LAB
BASOPHILS # BLD AUTO: 0.02 10*3/MM3 (ref 0–0.2)
BASOPHILS NFR BLD AUTO: 0.6 % (ref 0–1.5)
CHOLEST SERPL-MCNC: 195 MG/DL (ref 0–200)
EOSINOPHIL # BLD AUTO: 0.1 10*3/MM3 (ref 0–0.4)
EOSINOPHIL NFR BLD AUTO: 3.2 % (ref 0.3–6.2)
ERYTHROCYTE [DISTWIDTH] IN BLOOD BY AUTOMATED COUNT: 13.8 % (ref 12.3–15.4)
HCT VFR BLD AUTO: 42.6 % (ref 37.5–51)
HDLC SERPL-MCNC: 68 MG/DL (ref 40–60)
HGB BLD-MCNC: 14.2 G/DL (ref 13–17.7)
IMM GRANULOCYTES # BLD AUTO: 0.01 10*3/MM3 (ref 0–0.05)
IMM GRANULOCYTES NFR BLD AUTO: 0.3 % (ref 0–0.5)
IMP & REVIEW OF LAB RESULTS: NORMAL
LDLC SERPL CALC-MCNC: 97 MG/DL (ref 0–100)
LYMPHOCYTES # BLD AUTO: 1.02 10*3/MM3 (ref 0.7–3.1)
LYMPHOCYTES NFR BLD AUTO: 32.3 % (ref 19.6–45.3)
MCH RBC QN AUTO: 28.7 PG (ref 26.6–33)
MCHC RBC AUTO-ENTMCNC: 33.3 G/DL (ref 31.5–35.7)
MCV RBC AUTO: 86.1 FL (ref 79–97)
MONOCYTES # BLD AUTO: 0.32 10*3/MM3 (ref 0.1–0.9)
MONOCYTES NFR BLD AUTO: 10.1 % (ref 5–12)
NEUTROPHILS # BLD AUTO: 1.69 10*3/MM3 (ref 1.7–7)
NEUTROPHILS NFR BLD AUTO: 53.5 % (ref 42.7–76)
NRBC BLD AUTO-RTO: 0 /100 WBC (ref 0–0.2)
PLATELET # BLD AUTO: 209 10*3/MM3 (ref 140–450)
RBC # BLD AUTO: 4.95 10*6/MM3 (ref 4.14–5.8)
T4 FREE SERPL-MCNC: 1.39 NG/DL (ref 0.93–1.7)
THYROGLOB SERPL-MCNC: 18 NG/ML
TRIGL SERPL-MCNC: 175 MG/DL (ref 0–150)
TSH SERPL DL<=0.005 MIU/L-ACNC: 5.5 UIU/ML (ref 0.27–4.2)
VLDLC SERPL CALC-MCNC: 30 MG/DL (ref 5–40)
WBC # BLD AUTO: 3.16 10*3/MM3 (ref 3.4–10.8)

## 2023-02-17 ENCOUNTER — HOSPITAL ENCOUNTER (OUTPATIENT)
Dept: ULTRASOUND IMAGING | Facility: HOSPITAL | Age: 63
Discharge: HOME | End: 2023-02-17
Admitting: INTERNAL MEDICINE
Payer: COMMERCIAL

## 2023-02-17 DIAGNOSIS — C73 THYROID CANCER: ICD-10-CM

## 2023-02-17 PROCEDURE — 76536 US EXAM OF HEAD AND NECK: CPT

## 2023-04-10 ENCOUNTER — OFFICE VISIT (OUTPATIENT)
Dept: INTERNAL MEDICINE | Facility: CLINIC | Age: 63
End: 2023-04-10
Payer: COMMERCIAL

## 2023-04-10 VITALS
DIASTOLIC BLOOD PRESSURE: 70 MMHG | BODY MASS INDEX: 29.82 KG/M2 | RESPIRATION RATE: 16 BRPM | OXYGEN SATURATION: 98 % | HEART RATE: 78 BPM | WEIGHT: 190 LBS | TEMPERATURE: 97.8 F | HEIGHT: 67 IN | SYSTOLIC BLOOD PRESSURE: 120 MMHG

## 2023-04-10 DIAGNOSIS — E03.9 HYPOTHYROIDISM, UNSPECIFIED TYPE: Primary | ICD-10-CM

## 2023-04-10 DIAGNOSIS — H00.011 HORDEOLUM OF RIGHT UPPER EYELID, UNSPECIFIED HORDEOLUM TYPE: ICD-10-CM

## 2023-04-10 DIAGNOSIS — C73 THYROID CANCER: ICD-10-CM

## 2023-04-10 NOTE — PROGRESS NOTES
"Chief Complaint  Hypothyroidism (FOLLOW UP ) and Eye Problem (Right eye infection x 2 weeks in [place] )    Subjective        Crow Wolfe presents to [facility]  History of Present Illness  2 weeks was in [place], had an eye in he states, red bump on upper eyelid; given oral abx and topical cream      Objective   Vital Signs:  /70   Pulse 78   Temp 97.8 °F (36.6 °C)   Resp 16   Ht 170.2 cm (67\")   Wt 86.2 kg (190 lb)   SpO2 98%   BMI 29.76 kg/m²   Estimated body mass index is 29.76 kg/m² as calculated from the following:    Height as of this encounter: 170.2 cm (67\").    Weight as of this encounter: 86.2 kg (190 lb).             Physical Exam  Vitals and nursing note reviewed.   Constitutional:       General: He is not in acute distress.     Appearance: Normal appearance.   HENT:      Head: Normocephalic and atraumatic.      Right Ear: External ear normal.      Left Ear: External ear normal.      Nose: Nose normal.      Mouth/Throat:      Mouth: Mucous membranes are moist.      Pharynx: Oropharynx is clear.   Eyes:      Extraocular Movements: Extraocular movements intact.      Conjunctiva/sclera: Conjunctivae normal.      Pupils: Pupils are equal, round, and reactive to light.   Cardiovascular:      Rate and Rhythm: Normal rate and regular rhythm.      Pulses: Normal pulses.      Heart sounds: Normal heart sounds. No murmur heard.    No gallop.   Pulmonary:      Effort: Pulmonary effort is normal.      Breath sounds: Normal breath sounds.   Abdominal:      General: Abdomen is flat. Bowel sounds are normal. There is no distension.      Palpations: Abdomen is soft. There is no mass.      Tenderness: There is no abdominal tenderness.   Musculoskeletal:         General: No swelling. Normal range of motion.      Cervical back: Normal range of motion and neck supple.   Skin:     General: Skin is warm and dry.      Findings: No rash.   Neurological:      " General: No focal deficit present.      Mental Status: He is alert and oriented to person, place, and time. Mental status is at baseline.   Psychiatric:         Mood and Affect: Mood normal.         Behavior: Behavior normal.        Result Review :                   Assessment and Plan   Diagnoses and all orders for this visit:    1. Hypothyroidism, unspecified type (Primary)  -     TSH  -     T4, Free  -     Comprehensive Metabolic Panel  -     Lipid Panel With / Chol / HDL Ratio  -     Hemoglobin A1c  -     Thyroglobulin    2. Hordeolum of right upper eyelid, unspecified hordeolum type    3. Thyroid cancer    - check labs as above, will adjust TSH accordingly to goal, has endo follow up  - discussed supportive care to follod up hordeolum  - discussed risks/benefits/alternatives of meds/treatments           Follow Up   No follow-ups on file.  Patient was given instructions and counseling regarding his condition or for health maintenance advice. Please see specific information pulled into the AVS if appropriate.

## 2023-04-17 LAB
ALBUMIN SERPL-MCNC: 4.7 G/DL (ref 3.8–4.8)
ALBUMIN/GLOB SERPL: 1.9 {RATIO} (ref 1.2–2.2)
ALP SERPL-CCNC: 61 IU/L (ref 44–121)
ALT SERPL-CCNC: 30 IU/L (ref 0–44)
AST SERPL-CCNC: 26 IU/L (ref 0–40)
BILIRUB SERPL-MCNC: 0.4 MG/DL (ref 0–1.2)
BUN SERPL-MCNC: 19 MG/DL (ref 8–27)
BUN/CREAT SERPL: 22 (ref 10–24)
CALCIUM SERPL-MCNC: 9.7 MG/DL (ref 8.6–10.2)
CHLORIDE SERPL-SCNC: 101 MMOL/L (ref 96–106)
CHOLEST SERPL-MCNC: 224 MG/DL (ref 100–199)
CHOLEST/HDLC SERPL: 3.2 RATIO (ref 0–5)
CO2 SERPL-SCNC: 25 MMOL/L (ref 20–29)
CREAT SERPL-MCNC: 0.86 MG/DL (ref 0.76–1.27)
EGFRCR SERPLBLD CKD-EPI 2021: 99 ML/MIN/1.73
GLOBULIN SER CALC-MCNC: 2.5 G/DL (ref 1.5–4.5)
GLUCOSE SERPL-MCNC: 82 MG/DL (ref 70–99)
HBA1C MFR BLD: 5.8 % (ref 4.8–5.6)
HDLC SERPL-MCNC: 69 MG/DL
LDLC SERPL CALC-MCNC: 114 MG/DL (ref 0–99)
POTASSIUM SERPL-SCNC: 4.3 MMOL/L (ref 3.5–5.2)
PROT SERPL-MCNC: 7.2 G/DL (ref 6–8.5)
SODIUM SERPL-SCNC: 141 MMOL/L (ref 134–144)
T4 FREE SERPL-MCNC: 1.27 NG/DL (ref 0.82–1.77)
THYROGLOB SERPL-MCNC: 17 NG/ML
TRIGL SERPL-MCNC: 238 MG/DL (ref 0–149)
TSH SERPL DL<=0.005 MIU/L-ACNC: 7.58 UIU/ML (ref 0.45–4.5)
VLDLC SERPL CALC-MCNC: 41 MG/DL (ref 5–40)

## 2023-04-17 RX ORDER — LEVOTHYROXINE SODIUM 175 UG/1
175 TABLET ORAL DAILY
Qty: 30 TABLET | Refills: 3 | Status: SHIPPED | OUTPATIENT
Start: 2023-04-17

## 2023-05-01 RX ORDER — ROSUVASTATIN CALCIUM 10 MG/1
10 TABLET, COATED ORAL DAILY
Qty: 90 TABLET | Refills: 1 | Status: SHIPPED | OUTPATIENT
Start: 2023-05-01 | End: 2023-05-05 | Stop reason: SDUPTHER

## 2023-05-01 NOTE — TELEPHONE ENCOUNTER
Caller: Crow Wolfe    Relationship: Self    Best call back number: [phone] (Home)    Requested Prescriptions:   rosuvastatin (CRESTOR) 10 MG tablet     Pharmacy where request should be sent:  [facility]/pharmacy #[id number] - [place], [place] - [address] OF [place] [address] - [phone]  - [phone]      Last office visit with prescribing clinician: 4/10/2023   Last telemedicine visit with prescribing clinician: Visit date not found   Next office visit with prescribing clinician: Visit date not found     Additional details provided by patient: PATIENT CALLED TO REQUEST A MEDICATION REFILL ON HIS MEDICATION. PATIENT STATES THAT HE HAS A 5 DAY SUPPLY LEFT.            Does the patient have less than a 3 day supply:  [] Yes  [x] No    Would you like a call back once the refill request has been completed: [] Yes [] No    If the office needs to give you a call back, can they leave a voicemail: [] Yes [] No    Mayra Strickland Rep   05/01/23 12:01 EDT       THANKS

## 2023-05-01 NOTE — TELEPHONE ENCOUNTER
Rx Refill Note  Requested Prescriptions     Pending Prescriptions Disp Refills   • rosuvastatin (CRESTOR) 10 MG tablet 90 tablet 1     Sig: Take 1 tablet by mouth Daily.      Last office visit with prescribing clinician: 4/10/2023   Last telemedicine visit with prescribing clinician: Visit date not found   Next office visit with prescribing clinician: Visit date not found                         Would you like a call back once the refill request has been completed: [] Yes [] No    If the office needs to give you a call back, can they leave a voicemail: [] Yes [] No    Adam Guerrero MA  05/01/23, 13:54 EDT

## 2023-05-05 NOTE — TELEPHONE ENCOUNTER
Rx Refill Note  Requested Prescriptions     Pending Prescriptions Disp Refills   • rosuvastatin (CRESTOR) 10 MG tablet 90 tablet 1     Sig: Take 1 tablet by mouth Daily.      Last office visit with prescribing clinician: 4/10/2023   Last telemedicine visit with prescribing clinician: Visit date not found   Next office visit with prescribing clinician: Visit date not found                         Would you like a call back once the refill request has been completed: [] Yes [] No    If the office needs to give you a call back, can they leave a voicemail: [] Yes [] No    Iesha Rice MA  05/05/23, 09:39 EDT

## 2023-05-05 NOTE — TELEPHONE ENCOUNTER
Caller: Crow Wolfe    Relationship: Self    Best call back number: [phone]    Requested Prescriptions:   Requested Prescriptions     Pending Prescriptions Disp Refills   • rosuvastatin (CRESTOR) 10 MG tablet 90 tablet 1     Sig: Take 1 tablet by mouth Daily.        Pharmacy where request should be sent: [id number] [address] [address] [address] [place] [address] - [phone]  - [phone] FX     Last office visit with prescribing clinician: 4/10/2023   Last telemedicine visit with prescribing clinician: Visit date not found   Next office visit with prescribing clinician: Visit date not found     Additional details provided by patient: HAS 2 PILLS     Does the patient have less than a 3 day supply:  [x] Yes  [] No    Would you like a call back once the refill request has been completed: [x] Yes [] No    If the office needs to give you a call back, can they leave a voicemail: [] Yes [] No    Mayra De La Paz Rep   05/05/23 09:26 EDT

## 2023-05-07 RX ORDER — ROSUVASTATIN CALCIUM 10 MG/1
10 TABLET, COATED ORAL DAILY
Qty: 90 TABLET | Refills: 1 | Status: SHIPPED | OUTPATIENT
Start: 2023-05-07

## 2023-10-09 RX ORDER — LEVOTHYROXINE SODIUM 175 UG/1
175 TABLET ORAL DAILY
Qty: 30 TABLET | Refills: 3 | Status: SHIPPED | OUTPATIENT
Start: 2023-10-09

## 2023-11-06 ENCOUNTER — TELEPHONE (OUTPATIENT)
Dept: INTERNAL MEDICINE | Facility: CLINIC | Age: 63
End: 2023-11-06
Payer: COMMERCIAL

## 2023-11-06 DIAGNOSIS — R53.83 FATIGUE, UNSPECIFIED TYPE: ICD-10-CM

## 2023-11-06 DIAGNOSIS — E03.9 HYPOTHYROIDISM, UNSPECIFIED TYPE: Primary | ICD-10-CM

## 2023-11-06 NOTE — TELEPHONE ENCOUNTER
Caller: Crow Wolfe    Relationship: Self    Best call back number: [phone]     What orders are you requesting (i.e. lab or imaging): LABS    In what timeframe would the patient need to come in: ASAP    Where will you receive your lab/imaging services: [facility] [address]]   [address]    [phone] PHONE    Additional notes: PATIENT STATES DR. JENNINGS AGREED TO SEND LAB ORDERS UNTIL THEY FOUND NEW PROVIDER.

## 2023-11-18 LAB
CHOLEST SERPL-MCNC: 178 MG/DL (ref 100–199)
CHOLEST/HDLC SERPL: 3 RATIO (ref 0–5)
HDLC SERPL-MCNC: 60 MG/DL
LDLC SERPL CALC-MCNC: 76 MG/DL (ref 0–99)
T4 FREE SERPL-MCNC: 2.1 NG/DL (ref 0.82–1.77)
TESTOST SERPL-MCNC: 354 NG/DL (ref 264–916)
TRIGL SERPL-MCNC: 262 MG/DL (ref 0–149)
TSH SERPL DL<=0.005 MIU/L-ACNC: 0.01 UIU/ML (ref 0.45–4.5)
VLDLC SERPL CALC-MCNC: 42 MG/DL (ref 5–40)

## (undated) DEVICE — Device

## (undated) DEVICE — KT ORCA ORCAPOD DISP STRL

## (undated) DEVICE — GOWN PROC ENDOARMOR GI LVL3 HY/SHLD UNIV

## (undated) DEVICE — CANN NASL CO2 TRULINK W/O2 A/

## (undated) DEVICE — GOWN ISOL W/THUMB UNIV BLU BX/15

## (undated) DEVICE — FLEX ADVANTAGE 1500CC: Brand: FLEX ADVANTAGE

## (undated) DEVICE — VIAL FORMLN CAP 10PCT 20ML

## (undated) DEVICE — SINGLE-USE BIOPSY FORCEPS: Brand: RADIAL JAW 4